# Patient Record
Sex: MALE
[De-identification: names, ages, dates, MRNs, and addresses within clinical notes are randomized per-mention and may not be internally consistent; named-entity substitution may affect disease eponyms.]

---

## 2019-03-02 ENCOUNTER — HOSPITAL ENCOUNTER (EMERGENCY)
Dept: HOSPITAL 55 - ED | Age: 34
Discharge: HOME | DRG: 880 | End: 2019-03-02
Payer: COMMERCIAL

## 2019-03-02 VITALS — DIASTOLIC BLOOD PRESSURE: 108 MMHG | SYSTOLIC BLOOD PRESSURE: 133 MMHG | RESPIRATION RATE: 16 BRPM

## 2019-03-02 VITALS — OXYGEN SATURATION: 94 % | HEART RATE: 92 BPM

## 2019-03-02 VITALS — TEMPERATURE: 97.8 F

## 2019-03-02 DIAGNOSIS — F99: Primary | ICD-10-CM

## 2019-03-02 PROCEDURE — 99282 EMERGENCY DEPT VISIT SF MDM: CPT

## 2020-02-26 ENCOUNTER — HOSPITAL ENCOUNTER (EMERGENCY)
Facility: CLINIC | Age: 35
Discharge: HOME | End: 2020-02-26
Attending: EMERGENCY MEDICINE | Admitting: EMERGENCY MEDICINE
Payer: COMMERCIAL

## 2020-02-26 VITALS
TEMPERATURE: 98.8 F | SYSTOLIC BLOOD PRESSURE: 143 MMHG | DIASTOLIC BLOOD PRESSURE: 109 MMHG | OXYGEN SATURATION: 96 % | HEART RATE: 107 BPM | RESPIRATION RATE: 18 BRPM

## 2020-02-26 DIAGNOSIS — F20.9 SCHIZOPHRENIA, UNSPECIFIED TYPE (H): ICD-10-CM

## 2020-02-26 DIAGNOSIS — R44.0 AUDITORY HALLUCINATIONS: ICD-10-CM

## 2020-02-26 PROCEDURE — 99285 EMERGENCY DEPT VISIT HI MDM: CPT | Mod: 25

## 2020-02-26 PROCEDURE — 90791 PSYCH DIAGNOSTIC EVALUATION: CPT

## 2020-02-26 RX ORDER — CALCIUM CARBONATE 500 MG/1
1000 TABLET, CHEWABLE ORAL ONCE
Status: DISCONTINUED | OUTPATIENT
Start: 2020-02-26 | End: 2020-02-26 | Stop reason: HOSPADM

## 2020-02-26 ASSESSMENT — ENCOUNTER SYMPTOMS
NAUSEA: 1
APPETITE CHANGE: 1
HALLUCINATIONS: 1
DIAPHORESIS: 1
SLEEP DISTURBANCE: 1
NERVOUS/ANXIOUS: 1
HEADACHES: 1
CHILLS: 1

## 2020-02-26 NOTE — ED TRIAGE NOTES
Pt brought in by ambulance as he was hearing voices that want to harm him, also is not feeling supported at his group home. Has been cooperative, wanted to come to the ER for evaluation.    Calm, cooperative, alert and oriented x 4. ABC's intact.

## 2020-02-26 NOTE — ED PROVIDER NOTES
History     Chief Complaint:  Psychiatric Evaluation    HPI   Marcos Espitia is a 34 year old male who presents from group home via EMS for evaluation of auditory hallucinations. The patient states he would like to find a new place to live as he believes he is not being cared for at his group home. He states recently he has been unable to sleep and states he last slept about 2 days ago. He believes he is starting to have a manic episode and endorses voices that want to harm him. Due to his symptoms, he wanted to present for evaluation.     Here, the patient also endorses a headache, decreased appetite, chills, diaphoresis, or nausea. He denies any alcohol or drug use. He does not state any other symptoms prompting his presentation.     Allergies:  No Known Drug Allergies      Medications:    Abilify  Buspar  Glucophage  Zyprexa    Past Medical History:    Schizophrenia  Obesity  Vitamin-D deficiency     Past Surgical History:    History reviewed. No pertinent past surgical history.     Family History:    The patient denies any relevant family medical history.     Social History:  The patient was accompanied to the ED by EMS.  Smoking Status: Current  Smokeless Tobacco: Never  Alcohol Use: Not Currently  Drug Use: No    Marital Status:  Single     Review of Systems   Constitutional: Positive for appetite change, chills and diaphoresis.   Gastrointestinal: Positive for nausea.   Neurological: Positive for headaches.   Psychiatric/Behavioral: Positive for hallucinations and sleep disturbance. The patient is nervous/anxious.    All other systems reviewed and are negative.    Physical Exam     Patient Vitals for the past 24 hrs:   BP Temp Temp src Pulse Resp SpO2   02/26/20 0044 -- 98.8  F (37.1  C) Oral -- -- --   02/26/20 0043 -- -- -- -- 18 96 %   02/26/20 0042 143/109 -- -- 107 -- --     Physical Exam  Nursing note and vitals reviewed.  Constitutional: Cooperative. Sitting up comfortably.   HENT:   Mouth/Throat:  Mucous membranes are normal.   Cardiovascular: Normal rate, regular rhythm and normal heart sounds.  No murmur.  Pulmonary/Chest: Effort normal and breath sounds normal. No respiratory distress. No wheezes. No rales.   Abdominal: Soft. Normal appearance. There is no tenderness.  Musculoskeletal: Normal range of motion.   Neurological: Alert. Oriented x4  Skin: Skin is warm and dry. No rash noted.   Psychiatric: Normal mood and affect.  Slightly pressured speech. Endorses auditory hallucinations    Emergency Department Course   Emergency Department Course:  Past medical records, nursing notes, and vitals reviewed.    (0057)   I performed an exam of the patient as documented above. History obtained from patient.     (0105)   DEC paged.    (0117)   I spoke with the DEC service regarding patient's presentation, findings, and plan of care.      (0142)   I spoke with the DEC service regarding patient's presentation, findings, and plan of care.      (0147)   I rechecked the patient and discussed results and plan of care.     Findings and plan explained to the Patient. Patient discharged home with instructions regarding supportive care, medications, and reasons to return. The importance of close follow-up was reviewed. I personally answered all related questions prior to discharge.     Impression & Plan   Medical Decision Making:  Marcos is a 34 year old gentleman with schizophrenia who presents with several concerns. He has been hearing voices, but notes he has been hearing these since he was [age]. There is some command component, but he denies suicidality and he seems to have good insight. He has somewhat pressured speech and is tangential, but is redirectable and is otherwise appropriate. I do not feel he requires inpatient mental health assessment emergently. DEC as evaluated him and is in agreement. His main reason for being here is that it sounds like he is frustrated with the care he is receiving at his group home  and does not feel like they are helping him arrange his outpatient therapies as they should. DEC was able to arrange a mental health appointment at 3 o'clock today, which is excellent. He will be discharged home at this time. No concern for toxicological ingestion or other emergent pathology that would require lab work.     Diagnosis:    ICD-10-CM    1. Schizophrenia, unspecified type (H) F20.9    2. Auditory hallucinations R44.0      Disposition:  Discharged back to group home.     Scribe Disclosure:  I, Neymar Estrella, am serving as a scribe at 12:44 AM on 2/26/2020 to document services personally performed by Gene Sandoval MD based on my observations and the provider's statements to me.    2/26/2020   [facility] EMERGENCY DEPARTMENT       Gene Sandoval MD  02/26/20 0212

## 2020-04-23 ENCOUNTER — HOSPITAL ENCOUNTER (EMERGENCY)
Facility: CLINIC | Age: 35
Discharge: HOME | End: 2020-04-23
Attending: EMERGENCY MEDICINE | Admitting: EMERGENCY MEDICINE
Payer: COMMERCIAL

## 2020-04-23 VITALS
SYSTOLIC BLOOD PRESSURE: 142 MMHG | DIASTOLIC BLOOD PRESSURE: 119 MMHG | TEMPERATURE: 98.5 F | RESPIRATION RATE: 18 BRPM | OXYGEN SATURATION: 98 %

## 2020-04-23 DIAGNOSIS — R45.1 AGITATION: ICD-10-CM

## 2020-04-23 PROCEDURE — 99285 EMERGENCY DEPT VISIT HI MDM: CPT | Mod: 25

## 2020-04-23 PROCEDURE — 90791 PSYCH DIAGNOSTIC EVALUATION: CPT

## 2020-04-23 NOTE — ED NOTES
"ED Care Manager Note      Data:     Reason for ED visit:   Would like to have a group home change  Cognitive Status: awake and alert.  Primary Care Clinic Name: [facility]   Contact information and PCP information verified: No  Lives With: facility resident    Insurance concerns: No Insurance issues identified     Assessment:    Identified issues/concerns regarding health management:     In chart review, of DEC assessment, Pt reports that he came to the ED, because he would like to live in a different group home.    Action/Resources:    I phoned Rosalee, his ,  [phone].  She reported that Pt has been in a manicy state for the past 3 days.  Rosalee has phoned Pt's psychiatrist, to get stabilization medications, but she feels that Pt will be in this state until he is hospitalized.  She reports that Pt is hard to control and uncomfortable in his own body.  She took Pt to the doctor today , as he was complaining of stomach ache, and Pt phoned 911 on his own, from the clinic.  Rosalee reports that we don't have his correct current medication list, and that Pt is a \"gentle giant\", and so we may not recognize his symptoms, because we are not used to his baseline level.    [facility] [phone], and left a message on voicemail, stating that Pt was in the ED, requesting a new group home.    [facility] [phone], she does not have phone access at this time, so I sent her an email at:  [email or web address], stating that Pt was in the ED, requesting a new group home.    I reported to ED MD the information that Rosalee had told me.    Plan:    Will continue to follow and assist as needed.      Maryan Tucker RN Care Coordinator,  ANN MARIE, PHN, CCM, ANNE  Inpatient Care Coordination - Emergency Department  [facility]   [phone]    "

## 2020-04-23 NOTE — ED TRIAGE NOTES
Patient arrives via EMS from [facility]. Endorses feeling as though he is discriminated against and doesn't feel safe. Cooperative. History of schizophrenia.

## 2020-04-23 NOTE — DISCHARGE INSTRUCTIONS
Please return to the ED if you notice increasing suicidal ideation or thoughts, thoughts of hurting yourself or others, hallucinations, difficulty taking your medications or other acute concerns.  Please follow up with your PCP/psych in the next 2-3 days.      Discharge Instructions  Mental Health Concerns    You were seen today for mental health concerns, such as depression, anxiety, or suicidal thinking. Your provider feels that you do not require hospitalization at this time. However, your symptoms may become worse, and you may need to return to the Emergency Department. Most treatments of depression and suicidal thoughts are a process rather than a single intervention.  Medications and counseling can take several weeks or more to help.    Generally, every Emergency Department visit should have a follow-up clinic visit with either a primary or a specialty clinic/provider. Please follow-up as instructed by your emergency provider today.    By accepting these discharge instructions:  You promise to not harm yourself or others.  You agree that if you feel you are becoming unable to keep that promise, you will do something to help yourself before you do anything to harm yourself or others.   You agree to keep any safety plan arranged on your visit here today.  You agree to take any medication prescribed or recommended by your provider.  If you are getting worse, you can contact a friend or a family member, contact your counselor or family provider, contact a crisis line, or other options discussed with the provider or therapist today.  At any time, you can call 911 and return to the Emergency Department for more help.  You understand that follow-up is essential to your treatment, and you will make and keep appointments recommended on your visit today.    How to improve your mental health and prevent suicide:  Involve others by letting family, friends, counselors know.  Do not isolate yourself.  Avoid alcohol or  drugs. Remove weapons, poisons from your home.  Try to stick to routines for eating, sleeping and getting regular exercise.    Try to get into sunlight. Bright natural light not only treats seasonal affective disorder but also depression.  Increase safe activities that you enjoy.    If you feel worse, contact [phone] ([phone]), or call 911, or your primary provider/counselor for additional assistance.    If you were given a prescription for medicine here today, be sure to read all of the information (including the package insert) that comes with your prescription.  This will include important information about the medicine, its side effects, and any warnings that you need to know about.  The pharmacist who fills the prescription can provide more information and answer questions you may have about the medicine.  If you have questions or concerns that the pharmacist cannot address, please call or return to the Emergency Department.   Remember that you can always come back to the Emergency Department if you are not able to see your regular provider in the amount of time listed above, if you get any new symptoms, or if there is anything that worries you.

## 2020-04-23 NOTE — ED AVS SNAPSHOT
[facility] Emergency Department  [address]  [facility] [address]  Phone:  [phone]  Fax:  [phone]                                    Marcos Espitia   MRN: [id number]    Department:  [facility] Emergency Department   Date of Visit:  4/23/2020           After Visit Summary Signature Page    I have received my discharge instructions, and my questions have been answered. I have discussed any challenges I see with this plan with the nurse or doctor.    ..........................................................................................................................................  Patient/Patient Representative Signature      ..........................................................................................................................................  Patient Representative Print Name and Relationship to Patient    ..................................................               ................................................  Date                                   Time    ..........................................................................................................................................  Reviewed by Signature/Title    ...................................................              ..............................................  Date                                               Time          22EPIC Rev 08/18

## 2020-04-23 NOTE — ED PROVIDER NOTES
"  History   Chief Complaint:  Mental Health Problem     HPI   Marcos Espitia is a 34 year old male with history of schizophrenia, tobacco abuse, among others who presents from group home via EMS for evaluation of a mental health problem. The patient states he moved into this group home about 3 months ago and recently he began to feel discriminated against and socially isolated. He feels segregation from other individuals in the home (there are 4 other people), socially isolated, and that the staff are not talking or interacting with him as much. He also believes he cannot voice his opinions, stating the house supervisor will \"yell at him\".  He states his auditory and visual hallucinations have been slowly subsiding with his medication, which he has been compliant with since his last admission 1 month ago for mental health. Due to being unhappy at the group home he came to the ED.      Here, the patient states he has been working with his  out of [facility] and has told her about his concerns. He makes his own decisions and does not have a legal guardian. He denies any suicidal ideation, homicidal ideation, drug use, or alcohol use, but he does not his tobacco use has been reducing. He also denies any fever, cough, sore throat, chest pain, shortness of breath, or other complaints.     Allergies:  No Known Drug Allergies     Medications:   Abilify  Buspar  Metformin  Zyprexa     Past Medical History:    Obesity   Schizophrenia   Tobacco abuse    Past Surgical History:    History reviewed. No pertinent past surgical history.     Family History:    The patient denies any relevant family medical history.     Social History:  The patient was accompanied to the ED by EMS.  Smoking Status: Current Every Day Smoker  Smokeless Tobacco: Never Used  Alcohol Use: Yes  Drug Use: No  PCP: [facility], [place]     Review of Systems   All other systems reviewed and are negative.    Physical Exam     Patient " Vitals for the past 24 hrs:   BP Temp Temp src Heart Rate Resp SpO2   04/23/20 1739 -- -- -- 122 -- 98 %   04/23/20 1713 -- -- -- 134 18 97 %   04/23/20 1514 (!) 142/119 98.5  F (36.9  C) Oral 127 18 97 %     Physical Exam  General: Resting on the bed.    Head: No obvious trauma to head.  Ears, Nose, Throat:  External ears normal.  Nose normal.    Eyes:  Conjunctivae clear.  Pupils are equal, round, and reactive.   Neck: Normal range of motion.  Neck supple.   CV: Regular rate and rhythm.  No murmurs.      Respiratory: Effort normal and breath sounds normal.  No wheezing or crackles.   Gastrointestinal: Soft.  No distension. There is no tenderness.   Neuro: Alert. Moving all extremities appropriately.  Normal speech.    Skin: Skin is warm and dry.  No rash noted.   Psych: Normal mood and affect. Behavior is normal.  calm cooperative.  Organized thought process.  Engaged eye contact.  No SI or HI.  Chronic hallucinations.      Emergency Department Course   Emergency Department Course:  Past medical records, nursing notes, and vitals reviewed.    (1548)   I performed an exam of the patient as documented above. History obtained from patient.     (1555)   I spoke with our  about the patient's presentation, findings, and plan of care.     (1650)   I spoke with Rafaela from the DEC service regarding patient's presentation, findings, and plan of care.      (1658)   I rechecked the patient and discussed findings and plan of care.     (1717)   DEC paged.     (1724)   I rechecked the patient and discussed plan of care.     (1730)   I spoke with Rafaela from the DEC service regarding patient's presentation, findings, and plan of care.      Findings and plan explained to the Patient and caregiver. Patient discharged home with instructions regarding supportive care, medications, and reasons to return. The importance of close follow-up was reviewed. I personally answered all related questions prior to discharge.      Impression & Plan     Medical Decision Makin-year-old male presents with dissatisfaction with his group home.  He reports that he is not happy with his placement currently at his group home.  He denies any other medical concerns head to toe.  In reviewing his chart he does have some mild tachycardia at baseline, this seems to be consistent with his presentation today.  He denies any chest pain, shortness of breath, fevers, chills, cough or other acute concerns.  Patient was seen eval by dec, please see their note for further details.  Patient denies any acute mental health concerns.  He reports that his hallucinations are improving and he is compliant with his medications.  He reports the medications are helping with his symptoms.  He denies any suicidal or homicidal ideation.  He is calm, cooperative, organized in his thought process.  He does not appear to be manic or psychotic at this time.  His group home reported concerns of desmond but we do not see any evidence of this on examination.  He presents largely because he is dissatisfied by his group home and feels socially isolated.  He felt better after speaking with the dec  and the .  He seems to be quite lonely.  He wishes to go home and feels comfortable going back to the group home at this time.  We described that the group home placement cannot be done from the emergency department, he voices understanding.  Please see dec note for further details.  At this point patient does not appear to be immediate threat to self or others.  He reports compliance with the medications.  He does not appear to benefit from inpatient admission at this time.  At this point he seems reasonably safe for discharge.  [facility] came to bring the patient home.  Case management was involved and did speak with patient's  to make them aware of the situation as well.    Diagnosis:    ICD-10-CM    1. Agitation  R45.1       Disposition:  Discharged to home.     Scribe Disclosure:  I, Neymar Estrella, am serving as a scribe at 3:35 PM on 4/23/2020 to document services personally performed by Veda Santiago MD based on my observations and the provider's statements to me.  April 23, 2020   [facility] EMERGENCY DEPARTMENT        Veda Santiago MD  04/23/20 0391

## 2020-04-23 NOTE — ED NOTES
PD called group home staff to get better information of patient status. Group home staff reports patient has become increasingly more manic and paranoid. Has been a resident at the present group home 3 months and this was the first time he has behaved this way. Rosalee [facility] staff [phone].

## 2020-04-23 NOTE — ED NOTES
"This RN spoke with Dianna from group home. Dianna verbalized disagreement for patient's disposition back to group home. Dianna reports \"patient is one step away from being suicidal. He is having a schizophrenic break\". This RN discussed patient's presentation here as calm and cooperative. Dianna verbalized need to speak with her boss. MD updated.  "

## 2021-05-30 ENCOUNTER — RECORDS - HEALTHEAST (OUTPATIENT)
Dept: ADMINISTRATIVE | Facility: CLINIC | Age: 36
End: 2021-05-30